# Patient Record
Sex: MALE | Race: WHITE | NOT HISPANIC OR LATINO | Employment: OTHER | URBAN - METROPOLITAN AREA
[De-identification: names, ages, dates, MRNs, and addresses within clinical notes are randomized per-mention and may not be internally consistent; named-entity substitution may affect disease eponyms.]

---

## 2017-11-02 ENCOUNTER — HISTORICAL (OUTPATIENT)
Dept: ADMINISTRATIVE | Facility: HOSPITAL | Age: 57
End: 2017-11-02

## 2017-11-02 LAB
ALBUMIN SERPL-MCNC: 3.4 GM/DL (ref 3.4–5)
ALBUMIN/GLOB SERPL: 1 RATIO (ref 1–2)
ALP SERPL-CCNC: 60 UNIT/L (ref 45–117)
ALT SERPL-CCNC: 35 UNIT/L (ref 12–78)
AST SERPL-CCNC: 18 UNIT/L (ref 15–37)
BILIRUB SERPL-MCNC: 0.2 MG/DL (ref 0.2–1)
BILIRUBIN DIRECT+TOT PNL SERPL-MCNC: <0.1 MG/DL
BILIRUBIN DIRECT+TOT PNL SERPL-MCNC: ABNORMAL MG/DL
BUN SERPL-MCNC: 35 MG/DL (ref 7–18)
CALCIUM SERPL-MCNC: 9.7 MG/DL (ref 8.5–10.1)
CHLORIDE SERPL-SCNC: 111 MMOL/L (ref 98–107)
CO2 SERPL-SCNC: 21 MMOL/L (ref 21–32)
CREAT SERPL-MCNC: 1.4 MG/DL (ref 0.6–1.3)
ERYTHROCYTE [DISTWIDTH] IN BLOOD BY AUTOMATED COUNT: 12.8 % (ref 11.5–14.5)
EST. AVERAGE GLUCOSE BLD GHB EST-MCNC: 252 MG/DL
GLOBULIN SER-MCNC: 4.5 GM/ML (ref 2.3–3.5)
GLUCOSE SERPL-MCNC: 219 MG/DL (ref 74–106)
HBA1C MFR BLD: 10.4 % (ref 4.2–6.3)
HCT VFR BLD AUTO: 41.4 % (ref 40–51)
HGB BLD-MCNC: 13.8 GM/DL (ref 13.5–17.5)
MCH RBC QN AUTO: 31.6 PG (ref 26–34)
MCHC RBC AUTO-ENTMCNC: 33.3 GM/DL (ref 31–37)
MCV RBC AUTO: 94.7 FL (ref 80–100)
PLATELET # BLD AUTO: 251 X10(3)/MCL (ref 130–400)
PMV BLD AUTO: 11.1 FL (ref 7.4–10.4)
POTASSIUM SERPL-SCNC: 4.6 MMOL/L (ref 3.5–5.1)
PROT SERPL-MCNC: 7.9 GM/DL (ref 6.4–8.2)
RBC # BLD AUTO: 4.37 X10(6)/MCL (ref 4.5–5.9)
SODIUM SERPL-SCNC: 140 MMOL/L (ref 136–145)
WBC # SPEC AUTO: 8.8 X10(3)/MCL (ref 4.5–11)

## 2017-11-09 ENCOUNTER — HISTORICAL (OUTPATIENT)
Dept: SURGERY | Facility: HOSPITAL | Age: 57
End: 2017-11-09

## 2017-11-09 LAB — POTASSIUM SERPL-SCNC: 5.6 MMOL/L (ref 3.5–5.1)

## 2017-11-16 ENCOUNTER — HISTORICAL (OUTPATIENT)
Dept: SURGERY | Facility: HOSPITAL | Age: 57
End: 2017-11-16

## 2017-11-16 LAB — POTASSIUM SERPL-SCNC: 4.6 MMOL/L (ref 3.5–5.1)

## 2022-04-10 ENCOUNTER — HISTORICAL (OUTPATIENT)
Dept: ADMINISTRATIVE | Facility: HOSPITAL | Age: 62
End: 2022-04-10

## 2022-04-26 VITALS
WEIGHT: 315 LBS | BODY MASS INDEX: 42.66 KG/M2 | DIASTOLIC BLOOD PRESSURE: 81 MMHG | HEIGHT: 72 IN | SYSTOLIC BLOOD PRESSURE: 116 MMHG

## 2022-04-29 NOTE — DISCHARGE SUMMARY
Patient:   Brody Barnett            MRN: [id number]            FIN: [id number]               Age:   57 years     Sex:  Male     :  1960   Associated Diagnoses:   None   Author:   Marah Mijares MD      Date Admitted: 17  Date Discharged: Same  Chief Complaint: decreased vision left eye  Significant findings:        Physical Examination: cataract left eye       Laboratory: None        X-ray: None   Other Diagnostic Procedures: None  Principal Diagnosis: cataract left eye  Other Significant Diagnosis: None  Operations/Procedures: phacoemulsification cataract extraction intraocular lens implant left eye  Complications: None  Plan:        Follow-up: 0730 17 at [facility] ophthalmology clinic        Medications: Resume home medications        Diet: Resume home diet        Level of Activity: No strenous activity       Discharge to: Home       Condition at Discharge: Stable

## 2022-04-29 NOTE — OP NOTE
Patient:   Brody Barnett            MRN: [id number]            FIN: [id number]               Age:   57 years     Sex:  Male     :  1960   Associated Diagnoses:   None   Author:   Marah Mijares MD      SURGEON: Tuan Arroyo MD  ASSISTANT: Rosanne Stephens MD  ATTENDING: Uzair Parada MD  DATE OF SURGERY: 17  PROCEDURES: Phacoemulsification with intraocular lens, left eye   IMPLANT: iSert +18.50D  ANESTHESIA: MAC with topical tetracaine and intracameral lidocaine   COMPLICATIONS: None   ESTIMATED BLOOD LOSS: None      INDICATIONS:   The patient has a history of painless progressive visual loss and difficulty with activities of daily   living secondary to cataract formation. After a thorough discussion of the risks, benefits and   alternatives to cataract surgery, including, but not limited to, the rare risks of infection, retinal   detachment, need for additional surgery, loss of vision and even loss of the eye, the patient   voices good understanding and desires to proceed.      DESCRIPTION OF PROCEDURE:   The patients IOL calculations and lens selection were reviewed and confirmed. After   verification and marking of the proper eye in the preop holding area, several sets of 1%   Mydriacil, 2.5% phenylephrine, and 1% Cyclogyl drops were then placed. The patient was   brought to the operating room in supine position where the eye was prepped and draped in   standard sterile fashion using 5% betadine and a lid speculum placed. Topical tetracaine was   used in addition to the preoperative anesthesia. A paracentesis incision was created.  Intracameral lidocaine was then injected, and viscoelastic was used to fill the anterior chamber. A   2.4mm keratome blade was used to create a triplanar temporal clear corneal incision. A cystotome and   Utrata forceps was then used to fashion a continuous curvilinear capsulorrhexis. Hydrodissection and   hydrodelineation with BSS was performed using a  hydrodissection cannula. Phacoemulsification of the   nucleus was carried out with a divide and conquer technique, and all remaining epinuclear and cortical   material was removed. The eye was reformed using viscoelastic and the intraocular lens was   implanted into the capsular bag. All remaining viscoelastic was removed from the eye. At the   end of the case, the lens was well-centered and all wounds were found to be watertight. Drops of   flubiprofen, vigamox and predforte and tobradex ointment were instilled and an eye shield placed over   the eye. The patient tolerated the procedure well and was taken to the recovery area in stable   condition. The patient was instructed to follow-up for routine post-operative care the following   morning.

## 2022-04-29 NOTE — PROGRESS NOTES
Patient:   Brody Barnett            MRN: [id number]            FIN: [id number]               Age:   57 years     Sex:  Male     :  1960   Associated Diagnoses:   None   Author:   Angelo REA, Rosanne Hoffmann      Pt's cataract surgery cancelled and rescheduled for  due to OR unavailable today.

## 2022-04-29 NOTE — H&P
Patient:   Brody Barnett            MRN: [id number]            FIN: [id number]               Age:   57 years     Sex:  Male     :  1960   Associated Diagnoses:   None   Author:   Marah Mijares MD      Preoperative H&P      Chief complaint: Blurry vision  History of Present Illness: Patient is currently feeling well, able to lie flat without having shortness of breath, has no current complaints of pain, headache, dizziness, fever, or chills, and feels well enough to undergo eye surgery. Pt has history of increasingly poor vision, glare, and difficulty seeing out of the affected eye  PMHx/Psurg: see chart  Medications: see chart  Allergies: NKDA  Social: non-contributory  Family Hx: no ocular problems  ROS: Negative x 10 except for eye complaints pre-operatively; negative for fever, chills, weight loss, nausea, vomiting, diarrhea, shortness of breath, nasal discharge, cough, abdominal pain, difficulty moving arms and legs, confusion, dysuria, palpitations, or chest pain  Physical Examination: Patient appears well developed and well nourished and is in no acute distress, is normocephalic and atraumatic. Pt is resting comfortably and breathing at a normal rate. Pulses are intact and heart is beating at a normal rate. Abdomen is not distended. Pt is able to ambulate and move arms and legs appropriately. Pt is awake, alert, and oriented x3. See ophthalmology clinic note for full ocular details of examination findings.   Assessment/Plan: Patient will undergo pre-operative CBC, BMP, CXR, and EKG prior to eye surgery and clearance by medical personnel/anesthesia if necessary. Otherwise we will proceed with surgery. Discussed patients history, physical, assessment and plan with the attending who agrees and wishes to proceed with surgery as described in the previous ophthalmology clinic note.

## 2023-04-04 DIAGNOSIS — R31.9 HEMATURIA SYNDROME: Primary | ICD-10-CM

## 2023-06-14 ENCOUNTER — CLINICAL SUPPORT (OUTPATIENT)
Dept: RESPIRATORY THERAPY | Facility: HOSPITAL | Age: 63
End: 2023-06-14
Attending: ANESTHESIOLOGY
Payer: MEDICARE

## 2023-06-14 DIAGNOSIS — R19.5 ABNORMAL FECES: ICD-10-CM

## 2023-06-14 DIAGNOSIS — R19.5 ABNORMAL FECES: Primary | ICD-10-CM

## 2023-06-14 PROCEDURE — 93005 ELECTROCARDIOGRAM TRACING: CPT

## 2023-06-14 PROCEDURE — 93010 ELECTROCARDIOGRAM REPORT: CPT | Mod: ,,, | Performed by: INTERNAL MEDICINE

## 2023-06-14 PROCEDURE — 93010 EKG 12-LEAD: ICD-10-PCS | Mod: ,,, | Performed by: INTERNAL MEDICINE

## 2023-06-14 RX ORDER — MELOXICAM 15 MG/1
15 TABLET ORAL EVERY MORNING
COMMUNITY
Start: 2023-05-30

## 2023-06-14 RX ORDER — METOPROLOL TARTRATE 50 MG/1
50 TABLET ORAL EVERY MORNING
COMMUNITY
Start: 2023-05-30

## 2023-06-14 RX ORDER — ASPIRIN 81 MG/1
81 TABLET ORAL EVERY MORNING
COMMUNITY

## 2023-06-14 RX ORDER — GLIPIZIDE 10 MG/1
10 TABLET ORAL 2 TIMES DAILY
COMMUNITY
Start: 2023-05-30

## 2023-06-14 RX ORDER — LISINOPRIL 20 MG/1
20 TABLET ORAL EVERY MORNING
Status: ON HOLD | COMMUNITY
Start: 2023-05-30 | End: 2023-09-11 | Stop reason: HOSPADM

## 2023-06-14 RX ORDER — DULAGLUTIDE 3 MG/.5ML
3 INJECTION, SOLUTION SUBCUTANEOUS
COMMUNITY
Start: 2023-05-30

## 2023-06-14 RX ORDER — METFORMIN HYDROCHLORIDE 1000 MG/1
1000 TABLET ORAL 2 TIMES DAILY
COMMUNITY
Start: 2023-05-30

## 2023-06-14 RX ORDER — ALLOPURINOL 300 MG/1
300 TABLET ORAL EVERY MORNING
COMMUNITY
Start: 2023-05-30

## 2023-06-14 RX ORDER — FUROSEMIDE 20 MG/1
20 TABLET ORAL DAILY PRN
Status: ON HOLD | COMMUNITY
End: 2023-09-08

## 2023-06-14 RX ORDER — INSULIN GLARGINE 300 U/ML
135 INJECTION, SOLUTION SUBCUTANEOUS EVERY MORNING
Status: ON HOLD | COMMUNITY
Start: 2023-05-25 | End: 2023-09-11 | Stop reason: SDUPTHER

## 2023-06-14 NOTE — DISCHARGE INSTRUCTIONS
Follow prep on Monday. Clear liquids only. Nothing by mouth after midnight. Take Metoprolol and Lisinopril AM of procedure with small sip of water.                                                                                            INSTRUCTIONS  AFTER A COLONOSCOPY/EGD                                                                                    NO DRIVING X 24 HOURS. NOTIFY YOUR DOCTOR WITH                                                                                 ABDOMINAL PAIN UNRELIEVED BY  PASSING GAS,                                                                           FEVER WITHIN 24 HOURS, OR LARGE AMOUNT OF BLEEDING.

## 2023-06-19 ENCOUNTER — ANESTHESIA EVENT (OUTPATIENT)
Dept: SURGERY | Facility: HOSPITAL | Age: 63
End: 2023-06-19
Payer: MEDICARE

## 2023-06-19 NOTE — ANESTHESIA PREPROCEDURE EVALUATION
06/19/2023  Brody Barnett is a 63 y.o., male.      Pre-op Assessment    I have reviewed the Patient Summary Reports.     I have reviewed the Nursing Notes. I have reviewed the NPO Status.   I have reviewed the Medications.     Review of Systems  Anesthesia Hx:  Denies Family Hx of Anesthesia complications.   Denies Personal Hx of Anesthesia complications.   Social:  Non-Smoker, No Alcohol Use Morbid obesity   Hematology/Oncology:  Hematology Normal   Oncology Normal     EENT/Dental:EENT/Dental Normal   Cardiovascular:   Hypertension, well controlled ECG has been reviewed.    Pulmonary:  Pulmonary Normal    Renal/:  Renal/ Normal     Hepatic/GI:  Hepatic/GI Normal    Musculoskeletal:  Musculoskeletal Normal    Neurological:  Neurology Normal    Endocrine:   Diabetes, well controlled, type 2    Dermatological:  Skin Normal    Psych:  Psychiatric Normal           Physical Exam  General: Cooperative, Alert and Oriented    Airway:  Mallampati: II   Mouth Opening: Normal  TM Distance: Normal  Tongue: Normal  Neck ROM: Normal ROM    Dental:  Intact        Anesthesia Plan  Type of Anesthesia, risks & benefits discussed:    Anesthesia Type: Gen Natural Airway  Intra-op Monitoring Plan: Standard ASA Monitors  Post Op Pain Control Plan:   (medical reason for not using multimodal pain management)  Induction:  IV  Informed Consent: Informed consent signed with the Patient and all parties understand the risks and agree with anesthesia plan.  All questions answered. Patient consented to blood products? Yes  ASA Score: 3  Anesthesia Plan Notes: On GLP-1 injections, NPO after 4pm yesterday, Bicitra and Reglan prior to colonoscopy.    Ready For Surgery From Anesthesia Perspective.     .

## 2023-06-20 ENCOUNTER — HOSPITAL ENCOUNTER (OUTPATIENT)
Facility: HOSPITAL | Age: 63
Discharge: HOME | End: 2023-06-20
Attending: SURGERY | Admitting: SURGERY
Payer: MEDICARE

## 2023-06-20 ENCOUNTER — ANESTHESIA (OUTPATIENT)
Dept: SURGERY | Facility: HOSPITAL | Age: 63
End: 2023-06-20
Payer: MEDICARE

## 2023-06-20 VITALS
SYSTOLIC BLOOD PRESSURE: 129 MMHG | HEIGHT: 71 IN | WEIGHT: 315 LBS | DIASTOLIC BLOOD PRESSURE: 73 MMHG | BODY MASS INDEX: 44.1 KG/M2 | HEART RATE: 100 BPM | TEMPERATURE: 97 F | OXYGEN SATURATION: 95 %

## 2023-06-20 DIAGNOSIS — Z12.11 SCREEN FOR COLON CANCER: ICD-10-CM

## 2023-06-20 DIAGNOSIS — R19.5 HEME + STOOL: ICD-10-CM

## 2023-06-20 DIAGNOSIS — R19.4 ENCOUNTER FOR DIAGNOSTIC COLONOSCOPY DUE TO CHANGE IN BOWEL HABITS: Primary | ICD-10-CM

## 2023-06-20 DIAGNOSIS — R19.5 POSITIVE COLORECTAL CANCER SCREENING USING COLOGUARD TEST: ICD-10-CM

## 2023-06-20 LAB — POCT GLUCOSE: 230 MG/DL (ref 70–110)

## 2023-06-20 PROCEDURE — 63600175 PHARM REV CODE 636 W HCPCS: Performed by: ANESTHESIOLOGY

## 2023-06-20 PROCEDURE — 88305 TISSUE EXAM BY PATHOLOGIST: CPT | Performed by: SURGERY

## 2023-06-20 PROCEDURE — 45380 COLONOSCOPY AND BIOPSY: CPT | Mod: PT | Performed by: SURGERY

## 2023-06-20 PROCEDURE — 63600175 PHARM REV CODE 636 W HCPCS: Performed by: NURSE ANESTHETIST, CERTIFIED REGISTERED

## 2023-06-20 PROCEDURE — D9220A PRA ANESTHESIA: Mod: ,,, | Performed by: NURSE ANESTHETIST, CERTIFIED REGISTERED

## 2023-06-20 PROCEDURE — D9220A PRA ANESTHESIA: ICD-10-PCS | Mod: ,,, | Performed by: NURSE ANESTHETIST, CERTIFIED REGISTERED

## 2023-06-20 PROCEDURE — 25000003 PHARM REV CODE 250: Performed by: ANESTHESIOLOGY

## 2023-06-20 PROCEDURE — 82962 GLUCOSE BLOOD TEST: CPT | Performed by: SURGERY

## 2023-06-20 PROCEDURE — 25000003 PHARM REV CODE 250: Performed by: NURSE ANESTHETIST, CERTIFIED REGISTERED

## 2023-06-20 PROCEDURE — 37000009 HC ANESTHESIA EA ADD 15 MINS: Performed by: SURGERY

## 2023-06-20 PROCEDURE — C1773 RET DEV, INSERTABLE: HCPCS | Performed by: SURGERY

## 2023-06-20 PROCEDURE — 37000008 HC ANESTHESIA 1ST 15 MINUTES: Performed by: SURGERY

## 2023-06-20 PROCEDURE — 94761 N-INVAS EAR/PLS OXIMETRY MLT: CPT

## 2023-06-20 RX ORDER — SODIUM CITRATE AND CITRIC ACID MONOHYDRATE 334; 500 MG/5ML; MG/5ML
30 SOLUTION ORAL
Status: DISCONTINUED | OUTPATIENT
Start: 2023-06-20 | End: 2023-06-20

## 2023-06-20 RX ORDER — SODIUM CITRATE AND CITRIC ACID MONOHYDRATE 334; 500 MG/5ML; MG/5ML
30 SOLUTION ORAL
Status: COMPLETED | OUTPATIENT
Start: 2023-06-20 | End: 2023-06-20

## 2023-06-20 RX ORDER — METOCLOPRAMIDE HYDROCHLORIDE 5 MG/ML
10 INJECTION INTRAMUSCULAR; INTRAVENOUS
Status: COMPLETED | OUTPATIENT
Start: 2023-06-20 | End: 2023-06-20

## 2023-06-20 RX ORDER — LIDOCAINE HYDROCHLORIDE 20 MG/ML
INJECTION INTRAVENOUS
Status: DISCONTINUED | OUTPATIENT
Start: 2023-06-20 | End: 2023-06-20

## 2023-06-20 RX ORDER — PROPOFOL 10 MG/ML
VIAL (ML) INTRAVENOUS
Status: DISCONTINUED | OUTPATIENT
Start: 2023-06-20 | End: 2023-06-20

## 2023-06-20 RX ORDER — METOCLOPRAMIDE HYDROCHLORIDE 5 MG/ML
10 INJECTION INTRAMUSCULAR; INTRAVENOUS
Status: DISCONTINUED | OUTPATIENT
Start: 2023-06-20 | End: 2023-06-20

## 2023-06-20 RX ORDER — SODIUM CHLORIDE 9 MG/ML
INJECTION, SOLUTION INTRAVENOUS CONTINUOUS
Status: DISCONTINUED | OUTPATIENT
Start: 2023-06-20 | End: 2023-06-20 | Stop reason: HOSPADM

## 2023-06-20 RX ORDER — SODIUM CHLORIDE, SODIUM LACTATE, POTASSIUM CHLORIDE, CALCIUM CHLORIDE 600; 310; 30; 20 MG/100ML; MG/100ML; MG/100ML; MG/100ML
INJECTION, SOLUTION INTRAVENOUS CONTINUOUS
Status: ACTIVE | OUTPATIENT
Start: 2023-06-20

## 2023-06-20 RX ADMIN — PROPOFOL 50 MG: 10 INJECTION, EMULSION INTRAVENOUS at 07:06

## 2023-06-20 RX ADMIN — METOCLOPRAMIDE 10 MG: 5 INJECTION, SOLUTION INTRAMUSCULAR; INTRAVENOUS at 06:06

## 2023-06-20 RX ADMIN — SODIUM CHLORIDE, POTASSIUM CHLORIDE, SODIUM LACTATE AND CALCIUM CHLORIDE: 600; 310; 30; 20 INJECTION, SOLUTION INTRAVENOUS at 06:06

## 2023-06-20 RX ADMIN — SODIUM CITRATE AND CITRIC ACID MONOHYDRATE 30 ML: 500; 334 SOLUTION ORAL at 06:06

## 2023-06-20 RX ADMIN — LIDOCAINE HYDROCHLORIDE 20 MG: 20 INJECTION, SOLUTION INTRAVENOUS at 07:06

## 2023-06-20 RX ADMIN — PROPOFOL 100 MG: 10 INJECTION, EMULSION INTRAVENOUS at 07:06

## 2023-06-20 NOTE — OP NOTE
Procedure date:  06/20/2023    Indications: 63 y.o. male In need of diagnostic colonoscopy for evaluation of recent positive Cologuard testing.    Preoperative diagnosis:  1.  Positive Cologuard testing    Postoperative diagnosis:  1.  Mild diverticulosis 2.  Sessile rectal polyp    Procedure performed:  Colonoscopy with biopsy    Procedure in detail:  Patient was brought to the endoscopy suite laid in a left lateral decubitus position right side up.  Intravenous anesthesia was provided.  Digital rectal exam performed exhibiting good anorectal tone and no masses.  The endoscope was then passed through the anus intubating the rectum and with gentle insufflation reaching the cecum.  Upon reaching the proximal ascending colon pictures are taken the scope was then slowly withdrawn.  I was not able to maneuver past the cecal fold for complete evaluation.  The scope was removed and reinserted on 4 different occasions was then able to reach that point.  We were able to review the cecum from a distance within the mid to proximal ascending colon.  Overall the cecum ascending transverse descending and sigmoid colon all appeared to be grossly normal without mass polyp or ulcerations seen.  Within the rectum approximately 20 cm from the anal verge moderate-sized sessile polyp was identified.  Biopsy was performed for pathologic evaluation.  Pictures were taken.  There were also a few scattered diverticula noted within the rectosigmoid regions.  Scope was then retroflexed in the distal rectum revealing normal-appearing perianal mucosa no significant hemorrhoids.  It was then returned to neutral position and the colon was evacuated of air.  The patient was then relieved of anesthesia stable condition and transferred to postanesthesia care unit.    Specimens:  Rectal polyp biopsy at 20 cm from the anal verge    Findings:  Sessile rectal polyp and mild diverticulosis    Complications:  None    Recommendations:  Repeat colonoscopy  at a _5_ year interval, and referral to gastroenterologist for submucosal resection of rectal polyp.    Disposition: Upon recovery from anesthesia patient will be discharged to home with a follow up in surgery Clinic in 1 week for review of pathology    Rosa Ospina MD

## 2023-06-20 NOTE — ANESTHESIA POSTPROCEDURE EVALUATION
Anesthesia Post Evaluation    Patient: Brody Barnett    Procedure(s) Performed: Procedure(s) (LRB):  COLONOSCOPY (N/A)  COLONOSCOPY, WITH 1 OR MORE BIOPSIES (N/A)    Final Anesthesia Type: general      Patient location during evaluation: OPS  Patient participation: Yes- Able to Participate  Level of consciousness: awake and alert  Post-procedure vital signs: reviewed and stable  Pain management: adequate  Airway patency: patent  MARY mitigation strategies: Multimodal analgesia  PONV status at discharge: No PONV  Anesthetic complications: no      Cardiovascular status: hemodynamically stable  Respiratory status: unassisted, spontaneous ventilation and room air  Hydration status: euvolemic  Follow-up not needed.          Vitals  Taken Time   BP  06/20/23 0757   Temp  06/20/23 0757   Pulse  06/20/23 0757   Resp  06/20/23 0757   SpO2  06/20/23 0757         No case tracking events are documented in the log.      Pain/Catarina Score: No data recorded

## 2023-06-21 LAB — PSYCHE PATHOLOGY RESULT: NORMAL

## 2023-09-08 ENCOUNTER — HOSPITAL ENCOUNTER (OUTPATIENT)
Facility: HOSPITAL | Age: 63
Discharge: HOME | End: 2023-09-11
Attending: EMERGENCY MEDICINE | Admitting: STUDENT IN AN ORGANIZED HEALTH CARE EDUCATION/TRAINING PROGRAM
Payer: MEDICARE

## 2023-09-08 DIAGNOSIS — R06.02 SOB (SHORTNESS OF BREATH): ICD-10-CM

## 2023-09-08 DIAGNOSIS — R73.9 HYPERGLYCEMIA: ICD-10-CM

## 2023-09-08 DIAGNOSIS — N39.0 URINARY TRACT INFECTION WITHOUT HEMATURIA, SITE UNSPECIFIED: Primary | ICD-10-CM

## 2023-09-08 DIAGNOSIS — R07.9 CHEST PAIN: ICD-10-CM

## 2023-09-08 DIAGNOSIS — N17.9 AKI (ACUTE KIDNEY INJURY): ICD-10-CM

## 2023-09-08 LAB
ALBUMIN SERPL-MCNC: 3.2 G/DL (ref 3.4–4.8)
ALBUMIN/GLOB SERPL: 0.7 RATIO (ref 1.1–2)
ALP SERPL-CCNC: 56 UNIT/L (ref 40–150)
ALT SERPL-CCNC: 22 UNIT/L (ref 0–55)
AST SERPL-CCNC: 15 UNIT/L (ref 5–34)
BASOPHILS # BLD AUTO: 0.04 X10(3)/MCL
BASOPHILS NFR BLD AUTO: 0.3 %
BILIRUB SERPL-MCNC: 0.5 MG/DL
BUN SERPL-MCNC: 40 MG/DL (ref 8.4–25.7)
CALCIUM SERPL-MCNC: 9.9 MG/DL (ref 8.8–10)
CHLORIDE SERPL-SCNC: 105 MMOL/L (ref 98–107)
CO2 SERPL-SCNC: 20 MMOL/L (ref 23–31)
CREAT SERPL-MCNC: 1.6 MG/DL (ref 0.73–1.18)
EOSINOPHIL # BLD AUTO: 0.23 X10(3)/MCL (ref 0–0.9)
EOSINOPHIL NFR BLD AUTO: 1.9 %
ERYTHROCYTE [DISTWIDTH] IN BLOOD BY AUTOMATED COUNT: 12.9 % (ref 11.5–17)
FLUAV AG UPPER RESP QL IA.RAPID: NOT DETECTED
FLUBV AG UPPER RESP QL IA.RAPID: NOT DETECTED
GFR SERPLBLD CREATININE-BSD FMLA CKD-EPI: 48 MLS/MIN/1.73/M2
GLOBULIN SER-MCNC: 4.3 GM/DL (ref 2.4–3.5)
GLUCOSE SERPL-MCNC: 497 MG/DL (ref 82–115)
HCT VFR BLD AUTO: 38.4 % (ref 42–52)
HGB BLD-MCNC: 12.3 G/DL (ref 14–18)
IMM GRANULOCYTES # BLD AUTO: 0.05 X10(3)/MCL (ref 0–0.04)
IMM GRANULOCYTES NFR BLD AUTO: 0.4 %
LACTATE SERPL-SCNC: 2.7 MMOL/L (ref 0.5–2.2)
LACTATE SERPL-SCNC: 2.9 MMOL/L (ref 0.5–2.2)
LYMPHOCYTES # BLD AUTO: 2.28 X10(3)/MCL (ref 0.6–4.6)
LYMPHOCYTES NFR BLD AUTO: 19.2 %
MCH RBC QN AUTO: 31.5 PG (ref 27–31)
MCHC RBC AUTO-ENTMCNC: 32 G/DL (ref 33–36)
MCV RBC AUTO: 98.2 FL (ref 80–94)
MONOCYTES # BLD AUTO: 1.04 X10(3)/MCL (ref 0.1–1.3)
MONOCYTES NFR BLD AUTO: 8.8 %
NEUTROPHILS # BLD AUTO: 8.21 X10(3)/MCL (ref 2.1–9.2)
NEUTROPHILS NFR BLD AUTO: 69.4 %
PLATELET # BLD AUTO: 235 X10(3)/MCL (ref 130–400)
PMV BLD AUTO: 10.9 FL (ref 7.4–10.4)
POCT GLUCOSE: 284 MG/DL (ref 70–110)
POTASSIUM SERPL-SCNC: 4.5 MMOL/L (ref 3.5–5.1)
PROT SERPL-MCNC: 7.5 GM/DL (ref 5.8–7.6)
RBC # BLD AUTO: 3.91 X10(6)/MCL (ref 4.7–6.1)
SARS-COV-2 RNA RESP QL NAA+PROBE: NOT DETECTED
SODIUM SERPL-SCNC: 138 MMOL/L (ref 136–145)
WBC # SPEC AUTO: 11.85 X10(3)/MCL (ref 4.5–11.5)

## 2023-09-08 PROCEDURE — 94761 N-INVAS EAR/PLS OXIMETRY MLT: CPT

## 2023-09-08 PROCEDURE — 96361 HYDRATE IV INFUSION ADD-ON: CPT

## 2023-09-08 PROCEDURE — 0240U COVID/FLU A&B PCR: CPT | Performed by: EMERGENCY MEDICINE

## 2023-09-08 PROCEDURE — 83605 ASSAY OF LACTIC ACID: CPT | Performed by: EMERGENCY MEDICINE

## 2023-09-08 PROCEDURE — 63600175 PHARM REV CODE 636 W HCPCS: Performed by: STUDENT IN AN ORGANIZED HEALTH CARE EDUCATION/TRAINING PROGRAM

## 2023-09-08 PROCEDURE — 93010 ELECTROCARDIOGRAM REPORT: CPT | Mod: ,,, | Performed by: INTERNAL MEDICINE

## 2023-09-08 PROCEDURE — G0378 HOSPITAL OBSERVATION PER HR: HCPCS

## 2023-09-08 PROCEDURE — 25000003 PHARM REV CODE 250: Performed by: EMERGENCY MEDICINE

## 2023-09-08 PROCEDURE — 80053 COMPREHEN METABOLIC PANEL: CPT | Performed by: EMERGENCY MEDICINE

## 2023-09-08 PROCEDURE — 96372 THER/PROPH/DIAG INJ SC/IM: CPT | Performed by: STUDENT IN AN ORGANIZED HEALTH CARE EDUCATION/TRAINING PROGRAM

## 2023-09-08 PROCEDURE — 27000221 HC OXYGEN, UP TO 24 HOURS

## 2023-09-08 PROCEDURE — 99285 EMERGENCY DEPT VISIT HI MDM: CPT | Mod: 25

## 2023-09-08 PROCEDURE — 96365 THER/PROPH/DIAG IV INF INIT: CPT

## 2023-09-08 PROCEDURE — 96375 TX/PRO/DX INJ NEW DRUG ADDON: CPT

## 2023-09-08 PROCEDURE — 25000003 PHARM REV CODE 250: Performed by: STUDENT IN AN ORGANIZED HEALTH CARE EDUCATION/TRAINING PROGRAM

## 2023-09-08 PROCEDURE — 63600175 PHARM REV CODE 636 W HCPCS: Performed by: EMERGENCY MEDICINE

## 2023-09-08 PROCEDURE — 85025 COMPLETE CBC W/AUTO DIFF WBC: CPT | Performed by: EMERGENCY MEDICINE

## 2023-09-08 PROCEDURE — 93010 EKG 12-LEAD: ICD-10-PCS | Mod: ,,, | Performed by: INTERNAL MEDICINE

## 2023-09-08 PROCEDURE — 93005 ELECTROCARDIOGRAM TRACING: CPT

## 2023-09-08 RX ORDER — GLUCAGON 1 MG
1 KIT INJECTION
Status: DISCONTINUED | OUTPATIENT
Start: 2023-09-08 | End: 2023-09-09

## 2023-09-08 RX ORDER — ASPIRIN 81 MG/1
81 TABLET ORAL EVERY MORNING
Status: DISCONTINUED | OUTPATIENT
Start: 2023-09-09 | End: 2023-09-08

## 2023-09-08 RX ORDER — METOPROLOL TARTRATE 50 MG/1
50 TABLET ORAL EVERY MORNING
Status: DISCONTINUED | OUTPATIENT
Start: 2023-09-09 | End: 2023-09-08

## 2023-09-08 RX ORDER — ONDANSETRON 2 MG/ML
4 INJECTION INTRAMUSCULAR; INTRAVENOUS EVERY 6 HOURS PRN
Status: DISCONTINUED | OUTPATIENT
Start: 2023-09-08 | End: 2023-09-11 | Stop reason: HOSPADM

## 2023-09-08 RX ORDER — CALCIUM CARBONATE 200(500)MG
500 TABLET,CHEWABLE ORAL EVERY 4 HOURS PRN
Status: DISCONTINUED | OUTPATIENT
Start: 2023-09-08 | End: 2023-09-11 | Stop reason: HOSPADM

## 2023-09-08 RX ORDER — TAMSULOSIN HYDROCHLORIDE 0.4 MG/1
0.4 CAPSULE ORAL DAILY
Status: DISCONTINUED | OUTPATIENT
Start: 2023-09-08 | End: 2023-09-11 | Stop reason: HOSPADM

## 2023-09-08 RX ORDER — INSULIN ASPART 100 [IU]/ML
0-10 INJECTION, SOLUTION INTRAVENOUS; SUBCUTANEOUS
Status: DISCONTINUED | OUTPATIENT
Start: 2023-09-08 | End: 2023-09-11 | Stop reason: HOSPADM

## 2023-09-08 RX ORDER — GLIPIZIDE 10 MG/1
10 TABLET ORAL
Status: ON HOLD | COMMUNITY
End: 2023-09-11 | Stop reason: HOSPADM

## 2023-09-08 RX ORDER — HEPARIN SODIUM 5000 [USP'U]/ML
5000 INJECTION, SOLUTION INTRAVENOUS; SUBCUTANEOUS EVERY 8 HOURS
Status: DISCONTINUED | OUTPATIENT
Start: 2023-09-08 | End: 2023-09-11 | Stop reason: HOSPADM

## 2023-09-08 RX ORDER — SODIUM CHLORIDE 9 MG/ML
1000 INJECTION, SOLUTION INTRAVENOUS
Status: COMPLETED | OUTPATIENT
Start: 2023-09-08 | End: 2023-09-08

## 2023-09-08 RX ORDER — SODIUM CHLORIDE 0.9 % (FLUSH) 0.9 %
10 SYRINGE (ML) INJECTION
Status: DISCONTINUED | OUTPATIENT
Start: 2023-09-08 | End: 2023-09-11 | Stop reason: HOSPADM

## 2023-09-08 RX ORDER — MELOXICAM 15 MG/1
1 TABLET ORAL EVERY MORNING
Status: ON HOLD | COMMUNITY
End: 2023-09-11 | Stop reason: HOSPADM

## 2023-09-08 RX ORDER — TALC
6 POWDER (GRAM) TOPICAL NIGHTLY PRN
Status: DISCONTINUED | OUTPATIENT
Start: 2023-09-08 | End: 2023-09-11 | Stop reason: HOSPADM

## 2023-09-08 RX ORDER — ASPIRIN 81 MG/1
81 TABLET ORAL DAILY
Status: DISCONTINUED | OUTPATIENT
Start: 2023-09-09 | End: 2023-09-11 | Stop reason: HOSPADM

## 2023-09-08 RX ORDER — NALOXONE HCL 0.4 MG/ML
0.02 VIAL (ML) INJECTION
Status: DISCONTINUED | OUTPATIENT
Start: 2023-09-08 | End: 2023-09-11 | Stop reason: HOSPADM

## 2023-09-08 RX ORDER — METFORMIN HYDROCHLORIDE 1000 MG/1
1000 TABLET ORAL
Status: ON HOLD | COMMUNITY
End: 2023-09-11 | Stop reason: HOSPADM

## 2023-09-08 RX ORDER — SODIUM CHLORIDE 0.9 % (FLUSH) 0.9 %
10 SYRINGE (ML) INJECTION EVERY 12 HOURS PRN
Status: DISCONTINUED | OUTPATIENT
Start: 2023-09-08 | End: 2023-09-09

## 2023-09-08 RX ORDER — GLUCAGON 1 MG
1 KIT INJECTION
Status: DISCONTINUED | OUTPATIENT
Start: 2023-09-08 | End: 2023-09-11 | Stop reason: HOSPADM

## 2023-09-08 RX ORDER — LISINOPRIL 20 MG/1
1 TABLET ORAL EVERY MORNING
Status: ON HOLD | COMMUNITY
End: 2023-09-11 | Stop reason: HOSPADM

## 2023-09-08 RX ORDER — METOPROLOL TARTRATE 50 MG/1
50 TABLET ORAL DAILY
Status: DISCONTINUED | OUTPATIENT
Start: 2023-09-09 | End: 2023-09-11 | Stop reason: HOSPADM

## 2023-09-08 RX ORDER — ALLOPURINOL 300 MG/1
1 TABLET ORAL EVERY MORNING
Status: ON HOLD | COMMUNITY
End: 2023-09-11 | Stop reason: HOSPADM

## 2023-09-08 RX ORDER — SODIUM CHLORIDE, SODIUM LACTATE, POTASSIUM CHLORIDE, CALCIUM CHLORIDE 600; 310; 30; 20 MG/100ML; MG/100ML; MG/100ML; MG/100ML
INJECTION, SOLUTION INTRAVENOUS CONTINUOUS
Status: DISCONTINUED | OUTPATIENT
Start: 2023-09-08 | End: 2023-09-11

## 2023-09-08 RX ORDER — METOPROLOL SUCCINATE 50 MG/1
1 TABLET, EXTENDED RELEASE ORAL EVERY MORNING
COMMUNITY

## 2023-09-08 RX ADMIN — SODIUM CHLORIDE, POTASSIUM CHLORIDE, SODIUM LACTATE AND CALCIUM CHLORIDE: 600; 310; 30; 20 INJECTION, SOLUTION INTRAVENOUS at 04:09

## 2023-09-08 RX ADMIN — Medication 6 MG: at 09:09

## 2023-09-08 RX ADMIN — TAMSULOSIN HYDROCHLORIDE 0.4 MG: 0.4 CAPSULE ORAL at 03:09

## 2023-09-08 RX ADMIN — INSULIN DETEMIR 10 UNITS: 100 INJECTION, SOLUTION SUBCUTANEOUS at 09:09

## 2023-09-08 RX ADMIN — INSULIN ASPART 5 UNITS: 100 INJECTION, SOLUTION INTRAVENOUS; SUBCUTANEOUS at 09:09

## 2023-09-08 RX ADMIN — SODIUM CHLORIDE 1000 ML: 9 INJECTION, SOLUTION INTRAVENOUS at 02:09

## 2023-09-08 RX ADMIN — HEPARIN SODIUM 5000 UNITS: 5000 INJECTION, SOLUTION INTRAVENOUS; SUBCUTANEOUS at 09:09

## 2023-09-08 RX ADMIN — HUMAN INSULIN 10 UNITS: 100 INJECTION, SOLUTION SUBCUTANEOUS at 02:09

## 2023-09-08 RX ADMIN — INSULIN ASPART 6 UNITS: 100 INJECTION, SOLUTION INTRAVENOUS; SUBCUTANEOUS at 04:09

## 2023-09-08 RX ADMIN — CEFTRIAXONE SODIUM 2 G: 2 INJECTION, POWDER, FOR SOLUTION INTRAMUSCULAR; INTRAVENOUS at 01:09

## 2023-09-08 NOTE — H&P
[facility] - Emergency Dept  History & Physical    Subjective:      Chief Complaint/Reason for Admission: generalized fatigue and weakness    Brody Barnett is a 63 y.o. male w/ PMH of DM2, HTN, sleep apnea and frequent UTIs who presents from his doctors office after being evaluated for generalized weakness, fatigue and burning/frequency with urination. Patient reports that he gets UTIs a couple of times a year however this time his systemic symptoms are worse than normal. Patient also reports that he fevered this AM which he normally does not have.     Patient denies cp, n/v/d, LOC, headache, visual changes or any other systemic symptoms     Past Medical History:   Diagnosis Date    Arthritis     Diabetes mellitus     Gout     Hypertension     Sleep apnea     CPAP at night     Past Surgical History:   Procedure Laterality Date    APPENDECTOMY      CHOLECYSTECTOMY      COLONOSCOPY N/A 6/20/2023    Procedure: COLONOSCOPY;  Surgeon: Rosa Ospina MD;  Location: [facility];  Service: Endoscopy;  Laterality: N/A;  diverticulosis     COLONOSCOPY, WITH 1 OR MORE BIOPSIES N/A 6/20/2023    Procedure: COLONOSCOPY, WITH 1 OR MORE BIOPSIES;  Surgeon: Rosa Ospina MD;  Location: [facility];  Service: Endoscopy;  Laterality: N/A;  rectum polyp biopsy    SURGICAL REMOVAL OF PILONIDAL CYST      WRIST SURGERY Left      Family History   Problem Relation Age of Onset    Hyperlipidemia Mother     Diabetes Mother     Hyperlipidemia Father     Diabetes Father     Heart disease Father      Social History     Tobacco Use    Smoking status: Never    Smokeless tobacco: Never   Substance Use Topics    Alcohol use: Not Currently       (Not in a hospital admission)    Review of patient's allergies indicates:  No Known Allergies     Review of Systems   Constitutional:  Positive for fever and malaise/fatigue. Negative for diaphoresis and weight loss.   HENT: Negative.     Eyes: Negative.    Respiratory:  Negative for cough, sputum  production and shortness of breath.    Cardiovascular:  Negative for chest pain, palpitations, orthopnea and leg swelling.   Gastrointestinal:  Negative for abdominal pain, blood in stool, constipation, diarrhea, nausea and vomiting.   Genitourinary:  Positive for dysuria, frequency and urgency. Negative for flank pain and hematuria.   Musculoskeletal: Negative.    Skin: Negative.    Neurological: Negative.    Endo/Heme/Allergies: Negative.    Psychiatric/Behavioral: Negative.         Objective:      Vital Signs (Most Recent)  Temp: 99.2 °F (37.3 °C) (09/08/23 1220)  Pulse: 102 (09/08/23 1220)  Resp: 18 (09/08/23 1220)  BP: 110/66 (09/08/23 1220)  SpO2: 99 % (09/08/23 1220)    Vital Signs Range (Last 24H):  Temp:  [99.2 °F (37.3 °C)]   Pulse:  [102]   Resp:  [18]   BP: (110)/(66)   SpO2:  [99 %]     Physical Exam  Vitals reviewed.   Constitutional:       General: He is not in acute distress.     Appearance: He is well-developed. He is obese. He is not ill-appearing or diaphoretic.   HENT:      Head: Normocephalic and atraumatic.   Eyes:      General: No scleral icterus.  Cardiovascular:      Rate and Rhythm: Normal rate and regular rhythm.      Heart sounds: Normal heart sounds.   Pulmonary:      Effort: Pulmonary effort is normal.      Breath sounds: Normal breath sounds.   Abdominal:      General: Bowel sounds are normal. There is no distension.      Palpations: Abdomen is soft.      Tenderness: There is no abdominal tenderness.   Musculoskeletal:         General: Normal range of motion.      Cervical back: Normal range of motion.   Skin:     General: Skin is warm.   Neurological:      General: No focal deficit present.      Mental Status: He is alert and oriented to person, place, and time. Mental status is at baseline.         Data Review:  CBC:   Lab Results   Component Value Date    WBC 11.85 (H) 09/08/2023    RBC 3.91 (L) 09/08/2023    HGB 12.3 (L) 09/08/2023    HCT 38.4 (L) 09/08/2023     09/08/2023       ECG: normal sinus rhythm, no blocks or conduction defects, no ischemic changes.     Assessment:      Active Hospital Problems    Diagnosis  POA    *RENY (acute kidney injury) [N17.9]  Yes    UTI (urinary tract infection) [N39.0]  Yes      Resolved Hospital Problems   No resolved problems to display.       Plan:      Acute Kidney Injury  -baseline sCr is roughly 1.2-1.3 and presents with sCr of 1.60 and associated UTI with frequency and burning w/ urination.  -LR at 125cc/hr overnight  -Admit to obs under my care    Acute UTI w/o hematuria  -Ceftriaxone 1g q24 hr  -follow up with urine cultures  -direct antibiotic therapy based on cultures    Poorly Controlled DM2   -getting 10u insulin in ED  -Lantus 10u nightly  -Moderate dose sliding scale   -IVF  -Accu checks AC/HS    Admit to observation under my care.              Mitch Garcia MD  [facility]

## 2023-09-08 NOTE — ED PROVIDER NOTES
Encounter Date: 9/8/2023       History     Chief Complaint   Patient presents with    Fatigue    Shortness of Breath    Urinary Tract Infection     Brought per [facility] from Dr. Banks's office for c/o weakness and SOB, dx with UTI today by Dr. Banks     The patient is a 63-year-old male with a history of diabetes, hypertension, morbid obesity, who presents to the emergency department multiple complaints.  Patient states he woke up this morning with generalized weakness, nausea and burning on urination.  Patient went to go see his primary care provider where he had labs done that per him showed an elevated white blood cell count and urinary tract infection.  While there, patient states he had difficulty standing from a seated position secondary to some chronic knee pain and his generalized weakness.  EMS reports that patient's blood sugar was elevated at the office in the for 50s, he was hypoxic with a room air oxygen saturation of 92%, so he was sent here for further evaluation.  In regards to the hypoxia, patient denies any shortness of breath.  He states he has had a cough since having COVID a year ago that he does not think has been worse than before.  He states his symptoms today are symptoms he has had before in relation to urinary tract infections.    The history is provided by the patient and the EMS personnel. No  was used.     Review of patient's allergies indicates:  No Known Allergies  Past Medical History:   Diagnosis Date    Arthritis     Diabetes mellitus     Gout     Hypertension     Sleep apnea     CPAP at night     Past Surgical History:   Procedure Laterality Date    APPENDECTOMY      CHOLECYSTECTOMY      COLONOSCOPY N/A 6/20/2023    Procedure: COLONOSCOPY;  Surgeon: Rosa Ospina MD;  Location: [facility];  Service: Endoscopy;  Laterality: N/A;  diverticulosis     COLONOSCOPY, WITH 1 OR MORE BIOPSIES N/A 6/20/2023    Procedure: COLONOSCOPY, WITH 1 OR MORE BIOPSIES;  Surgeon:  Rosa Opsina MD;  Location: [facility];  Service: Endoscopy;  Laterality: N/A;  rectum polyp biopsy    SURGICAL REMOVAL OF PILONIDAL CYST      WRIST SURGERY Left      Family History   Problem Relation Age of Onset    Hyperlipidemia Mother     Diabetes Mother     Hyperlipidemia Father     Diabetes Father     Heart disease Father      Social History     Tobacco Use    Smoking status: Never    Smokeless tobacco: Never   Substance Use Topics    Alcohol use: Not Currently     Review of Systems   Constitutional:  Positive for activity change, appetite change and fatigue. Negative for fever.   HENT:  Negative for sore throat.    Respiratory:  Negative for shortness of breath.    Cardiovascular:  Negative for chest pain.   Gastrointestinal:  Negative for nausea.   Genitourinary:  Positive for dysuria.   Musculoskeletal:  Negative for back pain.   Skin:  Negative for rash.   Neurological:  Negative for weakness.   Hematological:  Does not bruise/bleed easily.   All other systems reviewed and are negative.      Physical Exam     Initial Vitals [09/08/23 1220]   BP Pulse Resp Temp SpO2   110/66 102 18 99.2 °F (37.3 °C) 99 %      MAP       --         Physical Exam    Nursing note and vitals reviewed.  Constitutional: He appears well-developed and well-nourished.   Morbidly obese   HENT:   Head: Normocephalic and atraumatic.   Nose: Nose normal.   Eyes: Conjunctivae and EOM are normal. Pupils are equal, round, and reactive to light.   Neck: Neck supple. No thyromegaly present.   Normal range of motion.  Cardiovascular:  Normal rate.     Exam reveals no friction rub.       Pulmonary/Chest: Breath sounds normal. No respiratory distress. He has no wheezes.   Abdominal: Abdomen is soft. He exhibits no distension. There is no abdominal tenderness.   Musculoskeletal:      Cervical back: Normal range of motion and neck supple.     Lymphadenopathy:     He has no cervical adenopathy.   Neurological: He is alert and oriented to person,  place, and time. He has normal strength. No sensory deficit. GCS score is 15. GCS eye subscore is 4. GCS verbal subscore is 5. GCS motor subscore is 6.   Skin: Skin is warm and dry. Capillary refill takes less than 2 seconds.   Psychiatric: He has a normal mood and affect. His behavior is normal. Judgment and thought content normal.         ED Course   Procedures  Labs Reviewed   COMPREHENSIVE METABOLIC PANEL - Abnormal; Notable for the following components:       Result Value    Carbon Dioxide 20 (*)     Glucose Level 497 (*)     Blood Urea Nitrogen 40.0 (*)     Creatinine 1.60 (*)     Albumin Level 3.2 (*)     Globulin 4.3 (*)     Albumin/Globulin Ratio 0.7 (*)     All other components within normal limits   LACTIC ACID, PLASMA - Abnormal; Notable for the following components:    Lactic Acid Level 2.7 (*)     All other components within normal limits   CBC WITH DIFFERENTIAL - Abnormal; Notable for the following components:    WBC 11.85 (*)     RBC 3.91 (*)     Hgb 12.3 (*)     Hct 38.4 (*)     MCV 98.2 (*)     MCH 31.5 (*)     MCHC 32.0 (*)     MPV 10.9 (*)     IG# 0.05 (*)     All other components within normal limits   LACTIC ACID, PLASMA - Abnormal; Notable for the following components:    Lactic Acid Level 2.9 (*)     All other components within normal limits   COVID/FLU A&B PCR - Normal    Narrative:     The Xpert Xpress SARS-CoV-2/FLU/RSV plus is a rapid, multiplexed real-time PCR test intended for the simultaneous qualitative detection and differentiation of SARS-CoV-2, Influenza A, Influenza B, and respiratory syncytial virus (RSV) viral RNA in either nasopharyngeal swab or nasal swab specimens.         CBC W/ AUTO DIFFERENTIAL    Narrative:     The following orders were created for panel order CBC auto differential.  Procedure                               Abnormality         Status                     ---------                               -----------         ------                     CBC with  Differential[[id number]]        Abnormal            Final result                 Please view results for these tests on the individual orders.   POCT GLUCOSE MONITORING CONTINUOUS   POCT GLUCOSE MONITORING CONTINUOUS     EKG Readings: (Independently Interpreted)   09/08/2023 at 12:48 p.m.   Ventricular rate 99   Normal sinus rhythm   Leftward axis deviation   QRS widened 140   Right bundle-branch block   Rare PVC  No acute ischemic changes   No STEMI  Interpreted by MD     ECG Results              EKG 12-lead (In process)  Result time 09/08/23 13:32:50      In process by Interface, Lab In [facility] (09/08/23 13:32:50)                   Narrative:    Test Reason : R06.02,    Vent. Rate : 099 BPM     Atrial Rate : 099 BPM     P-R Int : 166 ms          QRS Dur : 140 ms      QT Int : 378 ms       P-R-T Axes : 039 -23 011 degrees     QTc Int : 485 ms    Sinus rhythm with occasional Premature ventricular complexes  Right bundle branch block  Abnormal ECG  When compared with ECG of 14-JUN-2023 09:20,  Premature ventricular complexes are now Present  Inverted T waves have replaced nonspecific T wave abnormality in Anterior  leads    Referred By: [email or web address]   SELF           Confirmed By:                                   Imaging Results              X-Ray Chest 1 View (Final result)  Result time 09/08/23 14:10:05      Final result by Ric Danielle MD (09/08/23 14:10:05)                   Impression:      1. No active cardiopulmonary disease identified      Electronically signed by: Ric Danielle  Date:    09/08/2023  Time:    14:10               Narrative:    EXAMINATION:  XR CHEST 1 VIEW    CLINICAL HISTORY:  sob;, .    COMPARISON:  11/02/2017    FINDINGS:  An AP view or more reveals the heart to be normal in size.  The trachea is midline.  There is mild elevation of the right hemidiaphragm.  Bony structures appear grossly intact.                                       Medications   aspirin EC tablet 81 mg (has no  administration in time range)   metoprolol tartrate (LOPRESSOR) tablet 50 mg (has no administration in time range)   sodium chloride 0.9% flush 10 mL (has no administration in time range)   naloxone 0.4 mg/mL injection 0.02 mg (has no administration in time range)   glucagon (human recombinant) injection 1 mg (has no administration in time range)   sodium chloride 0.9% flush 10 mL (has no administration in time range)   melatonin tablet 6 mg (has no administration in time range)   heparin (porcine) injection 5,000 Units (has no administration in time range)   cefTRIAXone (ROCEPHIN) 1 g in dextrose 5 % in water (D5W) 100 mL IVPB (MB+) (has no administration in time range)   dextrose 10% bolus 125 mL 125 mL (has no administration in time range)   dextrose 10% bolus 250 mL 250 mL (has no administration in time range)   glucagon (human recombinant) injection 1 mg (has no administration in time range)   insulin detemir U-100 injection 10 Units (has no administration in time range)   insulin aspart U-100 injection 0-10 Units (6 Units Subcutaneous Given 9/8/23 1621)   dextrose 10% bolus 125 mL 125 mL (has no administration in time range)   dextrose 10% bolus 250 mL 250 mL (has no administration in time range)   tamsulosin 24 hr capsule 0.4 mg (0.4 mg Oral Given 9/8/23 1546)   calcium carbonate 200 mg calcium (500 mg) chewable tablet 500 mg (has no administration in time range)   ondansetron injection 4 mg (has no administration in time range)   lactated ringers infusion ( Intravenous Verify Only 9/8/23 1739)   cefTRIAXone (ROCEPHIN) 2 g in dextrose 5 % in water (D5W) 100 mL IVPB (MB+) (0 g Intravenous Stopped 9/8/23 1341)   insulin regular injection 10 Units 0.1 mL (10 Units Intravenous Given 9/8/23 1430)   0.9%  NaCl infusion ( Intravenous Stopped 9/8/23 1602)     Medical Decision Making  Patient is a ED with hyperglycemia, weakness, UTI.  Patient was seen in the ED by hospitalist to place admission orders for the above.   Patient given IV fluids, antibiotics and insulin.    Amount and/or Complexity of Data Reviewed  Labs: ordered.  Radiology: ordered.    Risk  OTC drugs.  Prescription drug management.                               Clinical Impression:   Final diagnoses:  [R06.02] SOB (shortness of breath)  [N17.9] RENY (acute kidney injury)  [N39.0] Urinary tract infection without hematuria, site unspecified (Primary)  [R73.9] Hyperglycemia        ED Disposition Condition    Observation                 Mayra Finn MD  09/08/23 7264

## 2023-09-08 NOTE — PLAN OF CARE
Problem: Adult Inpatient Plan of Care  Goal: Plan of Care Review  Outcome: Ongoing, Progressing  Goal: Patient-Specific Goal (Individualized)  Outcome: Ongoing, Progressing  Goal: Absence of Hospital-Acquired Illness or Injury  Outcome: Ongoing, Progressing  Goal: Optimal Comfort and Wellbeing  Outcome: Ongoing, Progressing  Goal: Readiness for Transition of Care  Outcome: Ongoing, Progressing     Problem: Bariatric Environmental Safety  Goal: Safety Maintained with Care  Outcome: Ongoing, Progressing     Problem: Fluid and Electrolyte Imbalance (Acute Kidney Injury/Impairment)  Goal: Fluid and Electrolyte Balance  Outcome: Ongoing, Progressing     Problem: Oral Intake Inadequate (Acute Kidney Injury/Impairment)  Goal: Optimal Nutrition Intake  Outcome: Ongoing, Progressing     Problem: Renal Function Impairment (Acute Kidney Injury/Impairment)  Goal: Effective Renal Function  Outcome: Ongoing, Progressing     Problem: Skin Injury Risk Increased  Goal: Skin Health and Integrity  Outcome: Ongoing, Progressing     Problem: Pain Acute  Goal: Acceptable Pain Control and Functional Ability  Outcome: Ongoing, Progressing

## 2023-09-09 LAB
ALBUMIN SERPL-MCNC: 2.8 G/DL (ref 3.4–4.8)
ALBUMIN/GLOB SERPL: 0.7 RATIO (ref 1.1–2)
ALP SERPL-CCNC: 55 UNIT/L (ref 40–150)
ALT SERPL-CCNC: 17 UNIT/L (ref 0–55)
AST SERPL-CCNC: 11 UNIT/L (ref 5–34)
BASOPHILS # BLD AUTO: 0.05 X10(3)/MCL
BASOPHILS NFR BLD AUTO: 0.5 %
BILIRUB SERPL-MCNC: 0.4 MG/DL
BUN SERPL-MCNC: 39 MG/DL (ref 8.4–25.7)
CALCIUM SERPL-MCNC: 9.4 MG/DL (ref 8.8–10)
CHLORIDE SERPL-SCNC: 108 MMOL/L (ref 98–107)
CO2 SERPL-SCNC: 22 MMOL/L (ref 23–31)
CREAT SERPL-MCNC: 1.31 MG/DL (ref 0.73–1.18)
EOSINOPHIL # BLD AUTO: 0.37 X10(3)/MCL (ref 0–0.9)
EOSINOPHIL NFR BLD AUTO: 3.8 %
ERYTHROCYTE [DISTWIDTH] IN BLOOD BY AUTOMATED COUNT: 12.9 % (ref 11.5–17)
GFR SERPLBLD CREATININE-BSD FMLA CKD-EPI: 60 MLS/MIN/1.73/M2
GLOBULIN SER-MCNC: 3.8 GM/DL (ref 2.4–3.5)
GLUCOSE SERPL-MCNC: 339 MG/DL (ref 82–115)
HCT VFR BLD AUTO: 37.9 % (ref 42–52)
HGB BLD-MCNC: 11.6 G/DL (ref 14–18)
IMM GRANULOCYTES # BLD AUTO: 0.03 X10(3)/MCL (ref 0–0.04)
IMM GRANULOCYTES NFR BLD AUTO: 0.3 %
LYMPHOCYTES # BLD AUTO: 3.1 X10(3)/MCL (ref 0.6–4.6)
LYMPHOCYTES NFR BLD AUTO: 31.8 %
MAGNESIUM SERPL-MCNC: 1.7 MG/DL (ref 1.6–2.6)
MCH RBC QN AUTO: 30.6 PG (ref 27–31)
MCHC RBC AUTO-ENTMCNC: 30.6 G/DL (ref 33–36)
MCV RBC AUTO: 100 FL (ref 80–94)
MONOCYTES # BLD AUTO: 0.86 X10(3)/MCL (ref 0.1–1.3)
MONOCYTES NFR BLD AUTO: 8.8 %
NEUTROPHILS # BLD AUTO: 5.33 X10(3)/MCL (ref 2.1–9.2)
NEUTROPHILS NFR BLD AUTO: 54.8 %
PHOSPHATE SERPL-MCNC: 3.4 MG/DL (ref 2.3–4.7)
PLATELET # BLD AUTO: 217 X10(3)/MCL (ref 130–400)
PMV BLD AUTO: 11 FL (ref 7.4–10.4)
POCT GLUCOSE: 303 MG/DL (ref 70–110)
POCT GLUCOSE: 330 MG/DL (ref 70–110)
POCT GLUCOSE: 376 MG/DL (ref 70–110)
POCT GLUCOSE: 378 MG/DL (ref 70–110)
POCT GLUCOSE: 394 MG/DL (ref 70–110)
POCT GLUCOSE: 396 MG/DL (ref 70–110)
POTASSIUM SERPL-SCNC: 4 MMOL/L (ref 3.5–5.1)
PROT SERPL-MCNC: 6.6 GM/DL (ref 5.8–7.6)
RBC # BLD AUTO: 3.79 X10(6)/MCL (ref 4.7–6.1)
SODIUM SERPL-SCNC: 141 MMOL/L (ref 136–145)
WBC # SPEC AUTO: 9.74 X10(3)/MCL (ref 4.5–11.5)

## 2023-09-09 PROCEDURE — 63600175 PHARM REV CODE 636 W HCPCS: Performed by: INTERNAL MEDICINE

## 2023-09-09 PROCEDURE — 96361 HYDRATE IV INFUSION ADD-ON: CPT

## 2023-09-09 PROCEDURE — 63600175 PHARM REV CODE 636 W HCPCS: Performed by: STUDENT IN AN ORGANIZED HEALTH CARE EDUCATION/TRAINING PROGRAM

## 2023-09-09 PROCEDURE — G0378 HOSPITAL OBSERVATION PER HR: HCPCS

## 2023-09-09 PROCEDURE — 96372 THER/PROPH/DIAG INJ SC/IM: CPT | Performed by: STUDENT IN AN ORGANIZED HEALTH CARE EDUCATION/TRAINING PROGRAM

## 2023-09-09 PROCEDURE — 99900035 HC TECH TIME PER 15 MIN (STAT)

## 2023-09-09 PROCEDURE — 83735 ASSAY OF MAGNESIUM: CPT | Performed by: STUDENT IN AN ORGANIZED HEALTH CARE EDUCATION/TRAINING PROGRAM

## 2023-09-09 PROCEDURE — 80053 COMPREHEN METABOLIC PANEL: CPT | Performed by: STUDENT IN AN ORGANIZED HEALTH CARE EDUCATION/TRAINING PROGRAM

## 2023-09-09 PROCEDURE — 25000003 PHARM REV CODE 250: Performed by: INTERNAL MEDICINE

## 2023-09-09 PROCEDURE — 84100 ASSAY OF PHOSPHORUS: CPT | Performed by: STUDENT IN AN ORGANIZED HEALTH CARE EDUCATION/TRAINING PROGRAM

## 2023-09-09 PROCEDURE — 96366 THER/PROPH/DIAG IV INF ADDON: CPT

## 2023-09-09 PROCEDURE — 25000003 PHARM REV CODE 250: Performed by: STUDENT IN AN ORGANIZED HEALTH CARE EDUCATION/TRAINING PROGRAM

## 2023-09-09 PROCEDURE — 85025 COMPLETE CBC W/AUTO DIFF WBC: CPT | Performed by: STUDENT IN AN ORGANIZED HEALTH CARE EDUCATION/TRAINING PROGRAM

## 2023-09-09 PROCEDURE — 94761 N-INVAS EAR/PLS OXIMETRY MLT: CPT

## 2023-09-09 PROCEDURE — 27000221 HC OXYGEN, UP TO 24 HOURS

## 2023-09-09 RX ORDER — GLIPIZIDE 5 MG/1
10 TABLET ORAL 2 TIMES DAILY
Status: DISCONTINUED | OUTPATIENT
Start: 2023-09-09 | End: 2023-09-11 | Stop reason: HOSPADM

## 2023-09-09 RX ADMIN — ASPIRIN 81 MG: 81 TABLET, COATED ORAL at 08:09

## 2023-09-09 RX ADMIN — HEPARIN SODIUM 5000 UNITS: 5000 INJECTION, SOLUTION INTRAVENOUS; SUBCUTANEOUS at 09:09

## 2023-09-09 RX ADMIN — Medication 6 MG: at 09:09

## 2023-09-09 RX ADMIN — INSULIN ASPART 10 UNITS: 100 INJECTION, SOLUTION INTRAVENOUS; SUBCUTANEOUS at 11:09

## 2023-09-09 RX ADMIN — HEPARIN SODIUM 5000 UNITS: 5000 INJECTION, SOLUTION INTRAVENOUS; SUBCUTANEOUS at 01:09

## 2023-09-09 RX ADMIN — INSULIN ASPART 8 UNITS: 100 INJECTION, SOLUTION INTRAVENOUS; SUBCUTANEOUS at 06:09

## 2023-09-09 RX ADMIN — INSULIN DETEMIR 10 UNITS: 100 INJECTION, SOLUTION SUBCUTANEOUS at 09:09

## 2023-09-09 RX ADMIN — TAMSULOSIN HYDROCHLORIDE 0.4 MG: 0.4 CAPSULE ORAL at 08:09

## 2023-09-09 RX ADMIN — CEFTRIAXONE SODIUM 1 G: 1 INJECTION, POWDER, FOR SOLUTION INTRAMUSCULAR; INTRAVENOUS at 07:09

## 2023-09-09 RX ADMIN — GLIPIZIDE 10 MG: 5 TABLET ORAL at 09:09

## 2023-09-09 RX ADMIN — SODIUM CHLORIDE, POTASSIUM CHLORIDE, SODIUM LACTATE AND CALCIUM CHLORIDE: 600; 310; 30; 20 INJECTION, SOLUTION INTRAVENOUS at 08:09

## 2023-09-09 RX ADMIN — INSULIN ASPART 5 UNITS: 100 INJECTION, SOLUTION INTRAVENOUS; SUBCUTANEOUS at 09:09

## 2023-09-09 RX ADMIN — HEPARIN SODIUM 5000 UNITS: 5000 INJECTION, SOLUTION INTRAVENOUS; SUBCUTANEOUS at 06:09

## 2023-09-09 RX ADMIN — METOPROLOL TARTRATE 50 MG: 50 TABLET, FILM COATED ORAL at 08:09

## 2023-09-09 RX ADMIN — SODIUM CHLORIDE, POTASSIUM CHLORIDE, SODIUM LACTATE AND CALCIUM CHLORIDE: 600; 310; 30; 20 INJECTION, SOLUTION INTRAVENOUS at 02:09

## 2023-09-09 RX ADMIN — INSULIN ASPART 10 UNITS: 100 INJECTION, SOLUTION INTRAVENOUS; SUBCUTANEOUS at 04:09

## 2023-09-09 NOTE — PROGRESS NOTES
Hospital Medicine  Progress Note    Patient Name: Brody Barnett  MRN: [id number]  Status: OP- Observation   Admission Date: 9/8/2023  Length of Stay: 0  Date of Service: 09/09/2023       CC: hospital follow-up for UTI       SUBJECTIVE   63 y.o. male with a history that includes poorly controlled IDDM II, and recurrent UTIs, presented from his PCP's office with UTI.  He presented to PCP with generalized weakness, fatigue and urinary burning/frequency.  UA/culture obtained and patient directed to ED.  Patient reports that he gets UTIs a couple of times a year. Patient also reports subjective fever at home prior to arrival.      Today: Patient seen and examined at bedside, and chart reviewed.  Afebrile overnight.  Reports improvement in urinary symptoms.      MEDICATIONS   Scheduled   aspirin  81 mg Oral Daily    cefTRIAXone (ROCEPHIN) IVPB  1 g Intravenous Q24H    glipiZIDE  10 mg Oral BID    heparin (porcine)  5,000 Units Subcutaneous Q8H    insulin detemir U-100  10 Units Subcutaneous QHS    metoprolol tartrate  50 mg Oral Daily    tamsulosin  0.4 mg Oral Daily     Continuous Infusions   lactated ringers 75 mL/hr at 09/09/23 1130         PHYSICAL EXAM   VITALS: T 97.9 °F (36.6 °C)   BP (!) 136/54   P 91   RR 20   O2 (!) 94 %    GENERAL: Awake and in NAD  LUNGS: Respirations non-labored  CVS: Normal rate  GI/: Soft, NT, bowel sounds positive.  EXTREMITIES: No peripheral edema  NEURO: AAOx3  PSYCH: Cooperative      LABS   CBC  Recent Labs     09/08/23  1315 09/09/23  0341   WBC 11.85* 9.74   RBC 3.91* 3.79*   HGB 12.3* 11.6*   HCT 38.4* 37.9*   MCV 98.2* 100.0*   MCH 31.5* 30.6   MCHC 32.0* 30.6*   RDW 12.9 12.9    217     CHEM  Recent Labs     09/08/23  1315 09/09/23  0341    141   K 4.5 4.0   CHLORIDE 105 108*   CO2 20* 22*   BUN 40.0* 39.0*   CREATININE 1.60* 1.31*   GLUCOSE 497* 339*   CALCIUM 9.9 9.4   MG  --  1.70   PHOS  --  3.4   ALBUMIN 3.2* 2.8*   GLOBULIN 4.3* 3.8*   ALKPHOS 56 55   ALT 22 17    AST 15 11   BILITOT 0.5 0.4       MICROBIOLOGY     Microbiology Results (last 7 days)       ** No results found for the last 168 hours. **            ASSESSMENT   UTI  RENY  Poorly controlled IDDM II with hyperglycemia  Essential HTN    PLAN   No culture here, will obtain results from PCP office on Mon  Continue with IV Ceftriaxone in the interim  Needs better glycemic control  Continue long-acting insulin, will add back home glipizide  Will adjust diabetic regimen further as indicated  Continue CBG monitoring and ISS coverage as indicated    Prophylaxis: SC heparin        Milo Ayala MD  [facility] Medicine

## 2023-09-10 LAB
ALBUMIN SERPL-MCNC: 2.8 G/DL (ref 3.4–4.8)
ALBUMIN/GLOB SERPL: 0.7 RATIO (ref 1.1–2)
ALP SERPL-CCNC: 50 UNIT/L (ref 40–150)
ALT SERPL-CCNC: 21 UNIT/L (ref 0–55)
AST SERPL-CCNC: 27 UNIT/L (ref 5–34)
BASOPHILS # BLD AUTO: 0.04 X10(3)/MCL
BASOPHILS NFR BLD AUTO: 0.5 %
BILIRUB SERPL-MCNC: 0.3 MG/DL
BUN SERPL-MCNC: 27 MG/DL (ref 8.4–25.7)
CALCIUM SERPL-MCNC: 10 MG/DL (ref 8.8–10)
CHLORIDE SERPL-SCNC: 107 MMOL/L (ref 98–107)
CO2 SERPL-SCNC: 23 MMOL/L (ref 23–31)
CREAT SERPL-MCNC: 1.16 MG/DL (ref 0.73–1.18)
EOSINOPHIL # BLD AUTO: 0.42 X10(3)/MCL (ref 0–0.9)
EOSINOPHIL NFR BLD AUTO: 5.6 %
ERYTHROCYTE [DISTWIDTH] IN BLOOD BY AUTOMATED COUNT: 12.6 % (ref 11.5–17)
GFR SERPLBLD CREATININE-BSD FMLA CKD-EPI: >60 MLS/MIN/1.73/M2
GLOBULIN SER-MCNC: 3.9 GM/DL (ref 2.4–3.5)
GLUCOSE SERPL-MCNC: 284 MG/DL (ref 82–115)
HCT VFR BLD AUTO: 35.6 % (ref 42–52)
HGB BLD-MCNC: 11.3 G/DL (ref 14–18)
IMM GRANULOCYTES # BLD AUTO: 0.06 X10(3)/MCL (ref 0–0.04)
IMM GRANULOCYTES NFR BLD AUTO: 0.8 %
LYMPHOCYTES # BLD AUTO: 2.78 X10(3)/MCL (ref 0.6–4.6)
LYMPHOCYTES NFR BLD AUTO: 37 %
MAGNESIUM SERPL-MCNC: 1.5 MG/DL (ref 1.6–2.6)
MCH RBC QN AUTO: 31.2 PG (ref 27–31)
MCHC RBC AUTO-ENTMCNC: 31.7 G/DL (ref 33–36)
MCV RBC AUTO: 98.3 FL (ref 80–94)
MONOCYTES # BLD AUTO: 0.66 X10(3)/MCL (ref 0.1–1.3)
MONOCYTES NFR BLD AUTO: 8.8 %
NEUTROPHILS # BLD AUTO: 3.56 X10(3)/MCL (ref 2.1–9.2)
NEUTROPHILS NFR BLD AUTO: 47.3 %
PHOSPHATE SERPL-MCNC: 3.5 MG/DL (ref 2.3–4.7)
PLATELET # BLD AUTO: 219 X10(3)/MCL (ref 130–400)
PMV BLD AUTO: 11.2 FL (ref 7.4–10.4)
POCT GLUCOSE: 338 MG/DL (ref 70–110)
POCT GLUCOSE: 356 MG/DL (ref 70–110)
POTASSIUM SERPL-SCNC: 4.2 MMOL/L (ref 3.5–5.1)
PROT SERPL-MCNC: 6.7 GM/DL (ref 5.8–7.6)
RBC # BLD AUTO: 3.62 X10(6)/MCL (ref 4.7–6.1)
SODIUM SERPL-SCNC: 141 MMOL/L (ref 136–145)
WBC # SPEC AUTO: 7.52 X10(3)/MCL (ref 4.5–11.5)

## 2023-09-10 PROCEDURE — 63600175 PHARM REV CODE 636 W HCPCS: Performed by: INTERNAL MEDICINE

## 2023-09-10 PROCEDURE — 96366 THER/PROPH/DIAG IV INF ADDON: CPT

## 2023-09-10 PROCEDURE — 27000221 HC OXYGEN, UP TO 24 HOURS

## 2023-09-10 PROCEDURE — 99900035 HC TECH TIME PER 15 MIN (STAT)

## 2023-09-10 PROCEDURE — G0378 HOSPITAL OBSERVATION PER HR: HCPCS

## 2023-09-10 PROCEDURE — 96372 THER/PROPH/DIAG INJ SC/IM: CPT | Performed by: INTERNAL MEDICINE

## 2023-09-10 PROCEDURE — 94761 N-INVAS EAR/PLS OXIMETRY MLT: CPT

## 2023-09-10 PROCEDURE — 84100 ASSAY OF PHOSPHORUS: CPT | Performed by: STUDENT IN AN ORGANIZED HEALTH CARE EDUCATION/TRAINING PROGRAM

## 2023-09-10 PROCEDURE — 85025 COMPLETE CBC W/AUTO DIFF WBC: CPT | Performed by: STUDENT IN AN ORGANIZED HEALTH CARE EDUCATION/TRAINING PROGRAM

## 2023-09-10 PROCEDURE — 25000003 PHARM REV CODE 250: Performed by: INTERNAL MEDICINE

## 2023-09-10 PROCEDURE — 96361 HYDRATE IV INFUSION ADD-ON: CPT

## 2023-09-10 PROCEDURE — 80053 COMPREHEN METABOLIC PANEL: CPT | Performed by: STUDENT IN AN ORGANIZED HEALTH CARE EDUCATION/TRAINING PROGRAM

## 2023-09-10 PROCEDURE — 25000003 PHARM REV CODE 250: Performed by: STUDENT IN AN ORGANIZED HEALTH CARE EDUCATION/TRAINING PROGRAM

## 2023-09-10 PROCEDURE — 96372 THER/PROPH/DIAG INJ SC/IM: CPT | Performed by: STUDENT IN AN ORGANIZED HEALTH CARE EDUCATION/TRAINING PROGRAM

## 2023-09-10 PROCEDURE — 63600175 PHARM REV CODE 636 W HCPCS: Performed by: STUDENT IN AN ORGANIZED HEALTH CARE EDUCATION/TRAINING PROGRAM

## 2023-09-10 PROCEDURE — 83735 ASSAY OF MAGNESIUM: CPT | Performed by: STUDENT IN AN ORGANIZED HEALTH CARE EDUCATION/TRAINING PROGRAM

## 2023-09-10 RX ORDER — MELOXICAM 7.5 MG/1
15 TABLET ORAL EVERY MORNING
Status: DISCONTINUED | OUTPATIENT
Start: 2023-09-10 | End: 2023-09-11 | Stop reason: HOSPADM

## 2023-09-10 RX ORDER — ACETAMINOPHEN 325 MG/1
650 TABLET ORAL EVERY 6 HOURS PRN
Status: DISCONTINUED | OUTPATIENT
Start: 2023-09-10 | End: 2023-09-11 | Stop reason: HOSPADM

## 2023-09-10 RX ADMIN — Medication 6 MG: at 10:09

## 2023-09-10 RX ADMIN — MELOXICAM 15 MG: 7.5 TABLET ORAL at 11:09

## 2023-09-10 RX ADMIN — SODIUM CHLORIDE, POTASSIUM CHLORIDE, SODIUM LACTATE AND CALCIUM CHLORIDE: 600; 310; 30; 20 INJECTION, SOLUTION INTRAVENOUS at 02:09

## 2023-09-10 RX ADMIN — INSULIN ASPART 5 UNITS: 100 INJECTION, SOLUTION INTRAVENOUS; SUBCUTANEOUS at 10:09

## 2023-09-10 RX ADMIN — HEPARIN SODIUM 5000 UNITS: 5000 INJECTION, SOLUTION INTRAVENOUS; SUBCUTANEOUS at 04:09

## 2023-09-10 RX ADMIN — INSULIN DETEMIR 15 UNITS: 100 INJECTION, SOLUTION SUBCUTANEOUS at 09:09

## 2023-09-10 RX ADMIN — HEPARIN SODIUM 5000 UNITS: 5000 INJECTION, SOLUTION INTRAVENOUS; SUBCUTANEOUS at 06:09

## 2023-09-10 RX ADMIN — GLIPIZIDE 10 MG: 5 TABLET ORAL at 10:09

## 2023-09-10 RX ADMIN — INSULIN DETEMIR 5 UNITS: 100 INJECTION, SOLUTION SUBCUTANEOUS at 11:09

## 2023-09-10 RX ADMIN — ACETAMINOPHEN 650 MG: 325 TABLET, FILM COATED ORAL at 11:09

## 2023-09-10 RX ADMIN — HEPARIN SODIUM 5000 UNITS: 5000 INJECTION, SOLUTION INTRAVENOUS; SUBCUTANEOUS at 10:09

## 2023-09-10 RX ADMIN — CEFTRIAXONE SODIUM 1 G: 1 INJECTION, POWDER, FOR SOLUTION INTRAMUSCULAR; INTRAVENOUS at 06:09

## 2023-09-10 RX ADMIN — ASPIRIN 81 MG: 81 TABLET, COATED ORAL at 10:09

## 2023-09-10 RX ADMIN — INSULIN ASPART 6 UNITS: 100 INJECTION, SOLUTION INTRAVENOUS; SUBCUTANEOUS at 06:09

## 2023-09-10 RX ADMIN — INSULIN ASPART 10 UNITS: 100 INJECTION, SOLUTION INTRAVENOUS; SUBCUTANEOUS at 04:09

## 2023-09-10 RX ADMIN — TAMSULOSIN HYDROCHLORIDE 0.4 MG: 0.4 CAPSULE ORAL at 10:09

## 2023-09-10 RX ADMIN — METOPROLOL TARTRATE 50 MG: 50 TABLET, FILM COATED ORAL at 10:09

## 2023-09-10 RX ADMIN — GLIPIZIDE 10 MG: 5 TABLET ORAL at 09:09

## 2023-09-10 NOTE — PLAN OF CARE
No s/s of distress. Ongoing monitoring of accuchecks. Fluids d/c per Dr. Villalobos. Will continue to monitor

## 2023-09-10 NOTE — PROGRESS NOTES
Hospital Medicine  Progress Note    Patient Name: Brody Barnett  MRN: [id number]  Status: OP- Observation   Admission Date: 9/8/2023  Length of Stay: 0  Date of Service: 09/10/2023       CC: hospital follow-up for UTI       SUBJECTIVE   63 y.o. male with a history that includes poorly controlled IDDM II, and recurrent UTIs, presented from his PCP's office with UTI.  He presented to PCP with generalized weakness, fatigue and urinary burning/frequency.  UA/culture obtained and patient directed to ED.  Patient reports that he gets UTIs a couple of times a year. Patient also reports subjective fever at home prior to arrival.      Today: Patient seen and examined at bedside, and chart reviewed.  Remains afebrile.  No new complaints or issues      MEDICATIONS   Scheduled   aspirin  81 mg Oral Daily    cefTRIAXone (ROCEPHIN) IVPB  1 g Intravenous Q24H    glipiZIDE  10 mg Oral BID    heparin (porcine)  5,000 Units Subcutaneous Q8H    insulin detemir U-100  15 Units Subcutaneous QHS    insulin detemir U-100  5 Units Subcutaneous Once    metoprolol tartrate  50 mg Oral Daily    tamsulosin  0.4 mg Oral Daily     Continuous Infusions   lactated ringers 75 mL/hr at 09/10/23 0709         PHYSICAL EXAM   VITALS: T 98.5 °F (36.9 °C)   BP (!) 158/88   P 90   RR 20   O2 (!) 93 %    GENERAL: Awake and in NAD  LUNGS: Respirations non-labored  CVS: Normal rate  GI/: Soft, NT, bowel sounds positive.  EXTREMITIES: No peripheral edema  NEURO: AAOx3  PSYCH: Cooperative      LABS   CBC  Recent Labs     09/09/23  0341 09/10/23  0502   WBC 9.74 7.52   RBC 3.79* 3.62*   HGB 11.6* 11.3*   HCT 37.9* 35.6*   .0* 98.3*   MCH 30.6 31.2*   MCHC 30.6* 31.7*   RDW 12.9 12.6    219       CHEM  Recent Labs     09/09/23  0341 09/10/23  0502    141   K 4.0 4.2   CHLORIDE 108* 107   CO2 22* 23   BUN 39.0* 27.0*   CREATININE 1.31* 1.16   GLUCOSE 339* 284*   CALCIUM 9.4 10.0   MG 1.70 1.50*   PHOS 3.4 3.5   ALBUMIN 2.8* 2.8*   GLOBULIN 3.8*  3.9*   ALKPHOS 55 50   ALT 17 21   AST 11 27   BILITOT 0.4 0.3         MICROBIOLOGY     Microbiology Results (last 7 days)       ** No results found for the last 168 hours. **            ASSESSMENT   UTI  RENY  Poorly controlled IDDM II with hyperglycemia  Essential HTN    PLAN   No culture here, will obtain results from PCP office on Mon  Continue with IV Ceftriaxone in the interim  Continue long-acting insulin, will increase Levemir to 15 units daily, continue home glipizide, holding metformin  Continue to adjust for tighter glycemic control  Continue CBG monitoring and ISS coverage as indicated      Prophylaxis: SC heparin        Milo Ayala MD  [facility]

## 2023-09-11 VITALS
OXYGEN SATURATION: 97 % | HEART RATE: 111 BPM | DIASTOLIC BLOOD PRESSURE: 81 MMHG | WEIGHT: 315 LBS | TEMPERATURE: 99 F | BODY MASS INDEX: 45.1 KG/M2 | SYSTOLIC BLOOD PRESSURE: 153 MMHG | HEIGHT: 70 IN | RESPIRATION RATE: 18 BRPM

## 2023-09-11 PROBLEM — N17.9 AKI (ACUTE KIDNEY INJURY): Status: RESOLVED | Noted: 2023-09-08 | Resolved: 2023-09-11

## 2023-09-11 LAB
ANION GAP SERPL CALC-SCNC: 10 MEQ/L
BUN SERPL-MCNC: 22 MG/DL (ref 8.4–25.7)
CALCIUM SERPL-MCNC: 10.8 MG/DL (ref 8.8–10)
CHLORIDE SERPL-SCNC: 105 MMOL/L (ref 98–107)
CO2 SERPL-SCNC: 25 MMOL/L (ref 23–31)
CREAT SERPL-MCNC: 1.07 MG/DL (ref 0.73–1.18)
CREAT/UREA NIT SERPL: 21
GFR SERPLBLD CREATININE-BSD FMLA CKD-EPI: >60 MLS/MIN/1.73/M2
GLUCOSE SERPL-MCNC: 283 MG/DL (ref 82–115)
POCT GLUCOSE: 248 MG/DL (ref 70–110)
POCT GLUCOSE: 358 MG/DL (ref 70–110)
POTASSIUM SERPL-SCNC: 4.4 MMOL/L (ref 3.5–5.1)
SODIUM SERPL-SCNC: 140 MMOL/L (ref 136–145)

## 2023-09-11 PROCEDURE — G0378 HOSPITAL OBSERVATION PER HR: HCPCS

## 2023-09-11 PROCEDURE — 96366 THER/PROPH/DIAG IV INF ADDON: CPT

## 2023-09-11 PROCEDURE — 25000003 PHARM REV CODE 250: Performed by: STUDENT IN AN ORGANIZED HEALTH CARE EDUCATION/TRAINING PROGRAM

## 2023-09-11 PROCEDURE — 96372 THER/PROPH/DIAG INJ SC/IM: CPT | Performed by: STUDENT IN AN ORGANIZED HEALTH CARE EDUCATION/TRAINING PROGRAM

## 2023-09-11 PROCEDURE — 63600175 PHARM REV CODE 636 W HCPCS: Performed by: STUDENT IN AN ORGANIZED HEALTH CARE EDUCATION/TRAINING PROGRAM

## 2023-09-11 PROCEDURE — 80048 BASIC METABOLIC PNL TOTAL CA: CPT | Performed by: INTERNAL MEDICINE

## 2023-09-11 PROCEDURE — 96361 HYDRATE IV INFUSION ADD-ON: CPT

## 2023-09-11 PROCEDURE — 94761 N-INVAS EAR/PLS OXIMETRY MLT: CPT

## 2023-09-11 PROCEDURE — 25000003 PHARM REV CODE 250: Performed by: INTERNAL MEDICINE

## 2023-09-11 PROCEDURE — 27000221 HC OXYGEN, UP TO 24 HOURS

## 2023-09-11 RX ORDER — TAMSULOSIN HYDROCHLORIDE 0.4 MG/1
0.4 CAPSULE ORAL DAILY
Qty: 30 CAPSULE | Refills: 0 | Status: SHIPPED | OUTPATIENT
Start: 2023-09-12 | End: 2024-09-11

## 2023-09-11 RX ORDER — CEFDINIR 300 MG/1
300 CAPSULE ORAL 2 TIMES DAILY
Qty: 8 CAPSULE | Refills: 0 | Status: SHIPPED | OUTPATIENT
Start: 2023-09-11 | End: 2023-09-15

## 2023-09-11 RX ORDER — INSULIN GLARGINE 300 U/ML
145 INJECTION, SOLUTION SUBCUTANEOUS EVERY MORNING
Qty: 3 ML | Refills: 5 | Status: SHIPPED | OUTPATIENT
Start: 2023-09-11

## 2023-09-11 RX ADMIN — INSULIN ASPART 4 UNITS: 100 INJECTION, SOLUTION INTRAVENOUS; SUBCUTANEOUS at 06:09

## 2023-09-11 RX ADMIN — HEPARIN SODIUM 5000 UNITS: 5000 INJECTION, SOLUTION INTRAVENOUS; SUBCUTANEOUS at 06:09

## 2023-09-11 RX ADMIN — TAMSULOSIN HYDROCHLORIDE 0.4 MG: 0.4 CAPSULE ORAL at 08:09

## 2023-09-11 RX ADMIN — ASPIRIN 81 MG: 81 TABLET, COATED ORAL at 08:09

## 2023-09-11 RX ADMIN — MELOXICAM 15 MG: 7.5 TABLET ORAL at 06:09

## 2023-09-11 RX ADMIN — CEFTRIAXONE SODIUM 1 G: 1 INJECTION, POWDER, FOR SOLUTION INTRAMUSCULAR; INTRAVENOUS at 08:09

## 2023-09-11 RX ADMIN — METOPROLOL TARTRATE 50 MG: 50 TABLET, FILM COATED ORAL at 08:09

## 2023-09-11 RX ADMIN — GLIPIZIDE 10 MG: 5 TABLET ORAL at 08:09

## 2023-09-11 NOTE — PLAN OF CARE
Problem: Adult Inpatient Plan of Care  Goal: Plan of Care Review  Outcome: Met  Goal: Patient-Specific Goal (Individualized)  Outcome: Met  Goal: Absence of Hospital-Acquired Illness or Injury  Outcome: Met  Goal: Optimal Comfort and Wellbeing  Outcome: Met  Goal: Readiness for Transition of Care  Outcome: Met     Problem: Bariatric Environmental Safety  Goal: Safety Maintained with Care  Outcome: Met     Problem: Fluid and Electrolyte Imbalance (Acute Kidney Injury/Impairment)  Goal: Fluid and Electrolyte Balance  Outcome: Met     Problem: Oral Intake Inadequate (Acute Kidney Injury/Impairment)  Goal: Optimal Nutrition Intake  Outcome: Met     Problem: Renal Function Impairment (Acute Kidney Injury/Impairment)  Goal: Effective Renal Function  Outcome: Met     Problem: Skin Injury Risk Increased  Goal: Skin Health and Integrity  Outcome: Met     Problem: Pain Acute  Goal: Acceptable Pain Control and Functional Ability  Outcome: Met

## 2023-09-11 NOTE — DISCHARGE SUMMARY
Hospital Medicine  Discharge Summary    Patient Name: Brody Barnett  MRN: [id number]  Admit Date: 9/8/2023  Discharge Date: 09/11/2023   Status: OP- Observation   Length of Stay: 0      PHYSICIANS   Admitting Physician: Mitch Garcia MD  Discharging Physician: Milo Ayala MD.  Primary Care Physician: Sathya Banks MD  Consults: None      DISCHARGE DIAGNOSES   UTI  RENY  Poorly controlled IDDM II with hyperglycemia  Essential HTN      PROCEDURES   None      HOSPITAL COURSE    63 y.o. male with a history that includes poorly controlled IDDM II, and recurrent UTIs, presented from his PCP's office with UTI.  He presented to PCP with generalized weakness, fatigue and urinary burning/frequency.  UA/culture obtained and patient directed to ED.  Patient reports that he gets UTIs a couple of times a year. Patient also reports subjective fever at home prior to arrival.  Work up in  ED noted white count of 11,800, BUN 40, Cr 1.6.  Patient was admitted to  services and continued on IV hydration and Ceftriaxone.  No urine was collected on admit, so patient continued on Ceftriaxone.  Renal function was improving, trended back down to baseline.  Contacted PCP office today, where initial UA was performed, but culture taken from that urine.  Patient is otherwise stable for discharge home. Will complete antibiotic regimen with oral cefdinir; can follow with PCP to ensure resolution.    STATUS  Improved    DISPOSITION  Discharge to home    DIET  Diabetic    ACTIVITY  As tolerated      FOLLOW-UP      Sathya Banks MD. Go on 9/18/2023.    Specialty: Family Medicine  Why: Appointment- 9/18/2023 At 8:30AM  Contact information:  [address]  [facility]  [place] [address]  [phone]                 DISCHARGE MEDICATION RECONCILIATION     PRESCRIBED     cefdinir 300 MG capsule  Commonly known as: OMNICEF  Take 1 capsule (300 mg total) by mouth 2 (two) times daily. for 4 days     tamsulosin 0.4 mg Cap  Commonly known as:  FLOMAX  Take 1 capsule (0.4 mg total) by mouth once daily.            CONTINUE with CHANGES     allopurinoL 300 MG tablet  Commonly known as: ZYLOPRIM  What changed: Another medication with the same name was removed. Continue taking this medication, and follow the directions you see here.     glipiZIDE 10 MG tablet  Commonly known as: GLUCOTROL  What changed: Another medication with the same name was removed. Continue taking this medication, and follow the directions you see here.     meloxicam 15 MG tablet  Commonly known as: MOBIC  What changed: Another medication with the same name was removed. Continue taking this medication, and follow the directions you see here.     metFORMIN 1000 MG tablet  Commonly known as: GLUCOPHAGE  What changed: Another medication with the same name was removed. Continue taking this medication, and follow the directions you see here.     TOUJEO MAX U-300 SOLOSTAR 300 unit/mL (3 mL) insulin pen  Generic drug: insulin glargine U-300 conc  Inject 146 Units into the skin every morning.  What changed: how much to take            CONTINUE      aspirin 81 MG EC tablet  Commonly known as: ECOTRIN     metoprolol succinate 50 MG 24 hr tablet  Commonly known as: TOPROL-XL     metoprolol tartrate 50 MG tablet  Commonly known as: LOPRESSOR     TRULICITY 3 mg/0.5 mL pen injector  Generic drug: dulaglutide            DISCONTINUE     lisinopriL 20 MG tablet  Commonly known as: PRINIVIL,ZESTRIL            These medications were sent to   [facility] [phone] [address]      Phone: [phone]   cefdinir 300 MG capsule  tamsulosin 0.4 mg Cap  TOUJEO MAX U-300 SOLOSTAR 300 unit/mL (3 mL) insulin pen      PHYSICAL EXAM   VITALS: T 98.5 °F (36.9 °C)   BP (!) 153/81   P (!) 111   RR 18   O2 97 %    GENERAL: Awake and in NAD  LUNGS: Respirations non-labored  CVS: Normal rate  GI/: Soft, NT, bowel sounds positive.  EXTREMITIES: No peripheral edema  NEURO: AAOx3  PSYCH:  Cooperative        Discharge time: 33 minutes     Milo Ayala MD  [facility]       DIAGNOSITCS   CBC:   Recent Labs   Lab 09/08/23  1315 09/09/23  0341 09/10/23  0502   WBC 11.85* 9.74 7.52   HGB 12.3* 11.6* 11.3*   HCT 38.4* 37.9* 35.6*    217 219       CMP:   Recent Labs   Lab 09/08/23  1315 09/09/23  0341 09/10/23  0502 09/11/23  0354   CALCIUM 9.9 9.4 10.0 10.8*   ALBUMIN 3.2* 2.8* 2.8*  --     141 141 140   K 4.5 4.0 4.2 4.4   CO2 20* 22* 23 25   BUN 40.0* 39.0* 27.0* 22.0   CREATININE 1.60* 1.31* 1.16 1.07   ALKPHOS 56 55 50  --    ALT 22 17 21  --    AST 15 11 27  --    BILITOT 0.5 0.4 0.3  --    MG  --  1.70 1.50*  --    PHOS  --  3.4 3.5  --      Estimated Creatinine Clearance: 112.7 mL/min (based on SCr of 1.07 mg/dL).      Recent Labs     09/09/23  1614 09/09/23  2127 09/10/23  1156 09/10/23  1559 09/10/23  2155 09/11/23  0653   POCTGLUCOSE 376* 378* 338* 356* 358* 248*          X-Ray Chest 1 View  Result Date: 9/8/2023  EXAMINATION: XR CHEST 1 VIEW CLINICAL HISTORY: sob;, . COMPARISON: 11/02/2017 FINDINGS: An AP view or more reveals the heart to be normal in size.  The trachea is midline.  There is mild elevation of the right hemidiaphragm.  Bony structures appear grossly intact.   Impression  1. No active cardiopulmonary disease identified   Electronically signed by: Ric Danielle Date:    09/08/2023 Time:    14:10

## 2023-09-11 NOTE — PLAN OF CARE
09/11/23 1147   Final Note   Assessment Type Final Discharge Note   Anticipated Discharge Disposition Home   Post-Acute Status   Discharge Delays None known at this time

## 2023-12-11 PROBLEM — N39.0 UTI (URINARY TRACT INFECTION): Status: RESOLVED | Noted: 2023-09-08 | Resolved: 2023-12-11

## (undated) DEVICE — SNARE POLYP STD SNG 7FR

## (undated) DEVICE — KIT SURGICAL COLON .25 1.1OZ